# Patient Record
Sex: MALE | Race: OTHER | HISPANIC OR LATINO | URBAN - METROPOLITAN AREA
[De-identification: names, ages, dates, MRNs, and addresses within clinical notes are randomized per-mention and may not be internally consistent; named-entity substitution may affect disease eponyms.]

---

## 2021-03-09 ENCOUNTER — OUTPATIENT (OUTPATIENT)
Dept: OUTPATIENT SERVICES | Facility: HOSPITAL | Age: 36
LOS: 1 days | End: 2021-03-09
Payer: SELF-PAY

## 2021-03-09 DIAGNOSIS — Z20.828 CONTACT WITH AND (SUSPECTED) EXPOSURE TO OTHER VIRAL COMMUNICABLE DISEASES: ICD-10-CM

## 2021-03-09 LAB — SARS-COV-2 RNA SPEC QL NAA+PROBE: SIGNIFICANT CHANGE UP

## 2021-03-09 PROCEDURE — U0003: CPT

## 2021-03-09 PROCEDURE — U0005: CPT

## 2022-05-26 ENCOUNTER — APPOINTMENT (OUTPATIENT)
Dept: PEDIATRICS | Facility: CLINIC | Age: 37
End: 2022-05-26

## 2022-05-27 PROBLEM — Z00.00 ENCOUNTER FOR PREVENTIVE HEALTH EXAMINATION: Status: ACTIVE | Noted: 2022-05-27

## 2022-07-07 ENCOUNTER — EMERGENCY (EMERGENCY)
Facility: HOSPITAL | Age: 37
LOS: 1 days | Discharge: HOME | End: 2022-07-07
Attending: EMERGENCY MEDICINE
Payer: COMMERCIAL

## 2022-07-07 VITALS
OXYGEN SATURATION: 98 % | TEMPERATURE: 98 F | DIASTOLIC BLOOD PRESSURE: 56 MMHG | HEIGHT: 65.75 IN | HEART RATE: 65 BPM | SYSTOLIC BLOOD PRESSURE: 120 MMHG | RESPIRATION RATE: 20 BRPM | WEIGHT: 210.1 LBS

## 2022-07-07 PROCEDURE — 99282 EMERGENCY DEPT VISIT SF MDM: CPT

## 2022-07-07 NOTE — ED PROVIDER NOTE - MUSCULOSKELETAL, MLM
Spine appears normal, range of motion is not limited, no muscle or joint tenderness, minimal joint laxity,

## 2022-07-07 NOTE — ED PROVIDER NOTE - CLINICAL SUMMARY MEDICAL DECISION MAKING FREE TEXT BOX
PT with stable VS, no acute distress, non toxic appearing, tolerating PO in the ED, Pt neuro vasc intact, stable knee, Upson knee rules neg for xray, Pt to be dc home with follow up to PCP, ortho, educated about when to return to the ED if needed. PT verbalizes that he understands all instructions and results. Pt informed that ED is open and available 24/7 365 days a yr, encouraged to return to the ED if they have any change in condition, or feel the need for revaluation.

## 2022-07-07 NOTE — ED ADULT NURSE NOTE - OBJECTIVE STATEMENT
pt c/o pain to right knee after twisting it when he fell from a ladder.  Ambulatory.  No obvious trauma present to knee.

## 2022-07-07 NOTE — ED PROVIDER NOTE - NS ED ATTENDING STATEMENT MOD
This was a shared visit with the BRAVO. I reviewed and verified the documentation and independently performed the documented:

## 2022-07-07 NOTE — ED PROVIDER NOTE - PATIENT PORTAL LINK FT
You can access the FollowMyHealth Patient Portal offered by [facility] by registering at the following website: [email or web address]. By joining [facility]’s FollowMyHealth portal, you will also be able to view your health information using other applications (apps) compatible with our system.

## 2022-07-07 NOTE — ED PROVIDER NOTE - PROGRESS NOTE DETAILS
Seth Raygoza PA-C: Pt informed that Xray at this time would be limited study due to needing evaluation of soft tissue will need MR, PT educated that he will need to follow up to schedule MR.

## 2022-07-07 NOTE — ED PROVIDER NOTE - ADDITIONAL NOTES AND INSTRUCTIONS:
PT was evaluated At [facility] ED and was found to have a condition that warranted time of to rest and heal from WORK/SCHOOL.   Seth Raygoza PA-C

## 2022-07-07 NOTE — ED PROVIDER NOTE - NSFOLLOWUPINSTRUCTIONS_ED_ALL_ED_FT
Dolor wilfred de rodilla en los adultos    Acute Knee Pain, Adult      El dolor wilfred de rodilla es repentino y puede deberse a daño, hinchazón o irritación de los músculos y tejidos que sostienen la rodilla. El dolor puede ser consecuencia de lo siguiente:  •Michael caída.      •Michael lesión en la rodilla debido a movimientos de rotación.      •Un golpe en la rodilla.      •Michael infección.      El dolor wilfred de rodilla puede desaparecer solo con el tiempo y el descanso. De no ser así, winters médico podría indicarle que se anders estudios para hallar la causa del dolor. Pueden incluir:  •Estudios de diagnóstico por imágenes, ana michael radiografía, michael resonancia magnética (RM), michael exploración por tomografía computarizada (TC) o michael ecografía.      •Aspiración de michael articulación. En radhika estudio, se extrae líquido de la rodilla y se evalúa.      •Artroscopia. En radhika estudio, se introduce un tubo iluminado en la rodilla y se proyecta michael imagen en michael pantalla de televisión.      •Biopsia. En radhika estudio, se gus michael muestra de tejido del organismo y se la estudia con un microscopio.        Siga estas instrucciones en winters casa:      Si tiene michael rodillera o un dispositivo ortopédico:      •Use la rodillera o el dispositivo ortopédico ana se lo haya indicado el médico. Quíteselos solamente ana se lo haya indicado el médico.      •Aflójelos si siente hormigueo en los dedos del pie, se le adormecen o se le enfrían y se tornan azulados.      •Manténgalos limpios.    •Si la rodillera o el dispositivo ortopédico no son impermeables:  •No deje que se mojen.      •Cúbralos con un envoltorio hermético cuando tome un baño de inmersión o michael ducha.        Actividad     •Descanse la rodilla.      • No anders cosas que le causen dolor o que lo intensifiquen.      •Evite las actividades o los ejercicios de alto impacto, ana correr, saltar la soga o hacer tamia de tijera.      •Trabaje con un fisioterapeuta para crear un programa de ejercicios seguros, según lo recomiende el médico. Anders ejercicios ana se lo haya indicado el fisioterapeuta.        Control del dolor, la rigidez y la hinchazón    •Si se lo indican, aplique hielo sobre la rodilla afectada. Para hacer esto:  •Si usa michael rodillera o un dispositivo ortopédico desmontables, quíteselos según las indicaciones del médico.      •Ponga el hielo en michael bolsa plástica.      •Coloque michael toalla entre la piel y la bolsa.      •Aplique el hielo benny 20 minutos, 2 o 3 veces por día.      •Retire el hielo si la piel se pone de color gomez brillante. Graf es muy importante. Si no puede sentir dolor, calor o frío, tiene un mayor riesgo de que se dañe la renee.        •Si se lo indican, use michael venda elástica para ejercer presión (compresión) en la rodilla lesionada. Graf puede controlar la hinchazón, darle sostén y ayudar a aliviar las molestias.      •Cuando esté sentado o acostado, levante (eleve) la rodilla por encima del nivel del corazón.      •Duerma con michael almohada debajo de la rodilla.      Indicaciones generales     •Use los medicamentos de venta gayle y los recetados solamente ana se lo haya indicado el médico.      • No consuma ningún producto que contenga nicotina o tabaco, ana cigarrillos, cigarrillos electrónicos y tabaco de mascar. Si necesita ayuda para dejar de consumir estos productos, consulte al médico.      •Si tiene sobrepeso, trabaje con el médico y un nutricionista para establecer michael meta de pérdida de peso que sea saludable y razonable para usted. El exceso de peso puede generar presión en la rodilla.      •Esté atento a cualquier cambio en los síntomas.      •Cumpla con todas las visitas de seguimiento. Graf es importante.        Comuníquese con un médico si:    •El dolor de rodilla continúa, cambia o empeora.      •Tiene fiebre junto con dolor de rodilla.      •La rodilla se siente caliente al tacto o está enrojecida.      •La rodilla se le tuerce o se le traba.        Solicite ayuda de inmediato si:    •La rodilla se hincha, y la hinchazón empeora.      •No puede  la rodilla.      •Tiene un dolor intenso en la rodilla que no se puede controlar con analgésicos.        Resumen    •El dolor wilfred de rodilla puede deberse a michael caída, michael lesión, michael infección o a daño, hinchazón o irritación de los tejidos que sostienen la rodilla.      •El médico podría hacerle estudios para hallar la causa del dolor.      •Esté atento a cualquier cambio en los síntomas. Alivie el dolor con descanso, medicamentos, actividad de poca intensidad y el uso de hielo.      •Obtenga ayuda de inmediato si la rodilla se hincha, no puede  la rodilla o tiene un dolor intenso que no se puede controlar con medicamentos.      Esta información no tiene ana fin reemplazar el consejo del médico. Asegúrese de hacerle al médico cualquier pregunta que tenga.

## 2022-07-07 NOTE — ED PROVIDER NOTE - CARE PROVIDER_API CALL
Sudeep Parmar)  Orthopedics  [address] [place] [id number]  Phone: [phone]  Fax: [phone]  Follow Up Time:

## 2022-07-07 NOTE — ED PROVIDER NOTE - NS ED ROS FT
ROS: CONTUSIONAL: Denies fever, chills, fatigue, wt loss. HEAD: Denies trauma, HA, Dizziness. EYE: Denies Acute visual changes, diplopia. ENMT: Denies change in hearing, tinnitus, epistaxis, difficulty swallowing, sore throat. CARDIO: Denies CP, palpitations, edema. RESP: Denies Cough, SOB , Diff breathing, hemoptysis. GI: Denies N/V, ABD pain, change in bowel movement. URINARY: Denies difficulty urinating, pelvic pain. MS: + joint pain,  Denies back pain, weakness, decreased ROM, swelling. NEURO: Denies change in gait, seizures, loss of sensation, dizziness, confusion LOC.  PSY: NO SI/HI. SKIN: Denies Rash, bruising.

## 2022-07-07 NOTE — ED PROVIDER NOTE - OBJECTIVE STATEMENT
PT with no SPMHx presents to the ED with complaint of Rt knee pain. Pt states that he had a acute on chronic knee pain. Pt states that he had a knee injury 3 wk ago then today he was at work stumbled and extended his knee and had a sudden onset of pain in his knee. Pt states that he had constat dull moderte pain that is non radiating made worse with activity improved by nothing. PT denies fever, chills, numbness tingling, loss of sensation saddle anesthesia, weakness, HE, loss of control of urine, or bowels IVDA.

## 2022-07-08 NOTE — CHART NOTE - NSCHARTNOTEFT_GEN_A_CORE
Outpatient Follow Up scheduled with Dr. Gallo on 7/15/22 at 10:00am  Orthopaedic Surgery  [address] [place] [id number]  Phone: [phone]

## 2022-07-15 ENCOUNTER — APPOINTMENT (OUTPATIENT)
Dept: ORTHOPEDIC SURGERY | Facility: CLINIC | Age: 37
End: 2022-07-15

## 2022-07-15 VITALS
HEIGHT: 67 IN | WEIGHT: 210 LBS | HEART RATE: 58 BPM | DIASTOLIC BLOOD PRESSURE: 84 MMHG | BODY MASS INDEX: 32.96 KG/M2 | SYSTOLIC BLOOD PRESSURE: 116 MMHG

## 2022-07-15 DIAGNOSIS — Z78.9 OTHER SPECIFIED HEALTH STATUS: ICD-10-CM

## 2022-07-15 DIAGNOSIS — Z72.3 LACK OF PHYSICAL EXERCISE: ICD-10-CM

## 2022-07-15 PROCEDURE — 73562 X-RAY EXAM OF KNEE 3: CPT | Mod: RT

## 2022-07-15 PROCEDURE — 99203 OFFICE O/P NEW LOW 30 MIN: CPT

## 2022-07-15 NOTE — HISTORY OF PRESENT ILLNESS
[Pain Location] : pain [] : right knee [Worsening] : worsening [6] : a current pain level of 6/10 [9] : an average pain level of 9/10 [8] : a minimum pain level of 8/10 [10] : a maximum pain level of 10/10 [Intermit.] : ~He/She~ states the symptoms seem to be intermittent [Direct Pressure] : worsened by direct pressure [Running] : worsened by running [Walking] : worsened by walking [Knee Flexion] : worsened with knee flexion [Knee Extension] : worsened with knee extension [Rest] : relieved by rest [de-identified] : 7/15/2022: Scooby is a pleasant 36-year-old male who presents to the office today complaining of a right knee injury that he sustained at work on 7/7/2022.  Patient states that he tripped on something and twisted his knee.  He felt immediate pain and subsequently developed swelling.  He had difficulty walking and was seen evaluated at another emergency department elsewhere where x-rays were taken and he was told he did not break anything.  He was told to follow-up with a specialist and he comes in to see me today for further recommendations.  He has been icing the knee and taking Tylenol for pain.  He has never injured this knee before.  He reports instability when he walks and the sensation of the knee giving out.  He has been wearing an over-the-counter brace.  The patient denies any fevers, chills, sweats, recent illnesses, numbness, tingling, weakness, or pain elsewhere at this time.

## 2022-07-15 NOTE — PHYSICAL EXAM
[de-identified] : General:\par Awake, alert, no acute distress, Patient was cooperative and appropriate during the examination.\par \par The patient is of normal weight for height and age.\par \par Walks with an antalgic gait on the right side.\par \par Full, painless range of motion of the neck and back.\par \par Exam of the bilateral lower extremities is intact and symmetric with regards to dermatologic, vascular, and neurologic exam. Bilateral lower extremity sensation is grossly intact to light touch in the DP/SP/T/S/S nerve distributions. Intact DF/PF/EHL. BIlateral lower extremity warm and well-perfused with brisk capillary refill.\par \par Pulmonary:\par Regular, nonlabored breathing\par \par Abdomen:\par Soft, nontender, nondistended.\par \par Lymphatic:\par No evidence of inguinal lymphadenopathy\par \par Right knee Examination:\par Physical examination of the knee demonstrates normal skin without signs of skin changes or abnormalities. No erythema or warmth is appreciated. There is a moderate joint effusion.\par \par Sensation is intact to light touch L2-S1\par Palpable DP/PT pulse\par EHL/FHL/TA/GSC motor function intact\par \par Range of Motion\par 0 to 100 degrees with pain at terminal flexion\par \par Strength Testing\par Quadriceps/Hamstring 5/5\par Patient is able to perform a straight leg raise without difficulty.\par \par Palpation\par Not tender to palpation about the distal femur, proximal tibia, or patella\par No palpable defect appreciated in the quadriceps or patellar tendons\par Exquisitely tender to palpation of medial joint line\par Mildly tender to palpation of lateral joint line\par \par Special Tests\par Anterior Drawer equivocal secondary to guarding\par Posterior Drawer negative\par Lachman Exam mildly increased laxity compared to contralateral side\par No Varus or Valgus Laxity at 0 or 30 degrees of knee flexion\par Rafa's Test positive medially\par Active compression of the patella is negative for pain\par Translation of the patella 2 quadrants with a firm endpoint [de-identified] : X-rays including 3 views of the right knee were obtained in the office today on 7/15/2022 and reviewed the patient.  There is no acute fracture or dislocation.  There is no significant arthritis.

## 2022-07-15 NOTE — DISCUSSION/SUMMARY
[de-identified] : Assessment: 36-year-old male with right knee pain and instability\par \par Plan: I had a long discussion with the patient today regarding the nature of their diagnosis and treatment plan. We discussed the risks and benefits of no treatment as well as nonoperative and operative treatments.  I reviewed the patient's x-rays today with him in the office which are negative for any acute pathology.  On examination he has significant pain medially about the knee with a mildly increased Lachman exam.  His instability exam is somewhat limited the patient's pain, swelling, and guarding.  He does report symptomatic instability when walking.  At this time I recommend an MRI to rule out any meniscal or ligamentous pathology.  He may continue to treat himself symptomatically on his own at home.  Recommend follow-up after the MRI to discuss the results in person and any further recommendations.  If he does have any significant internal derangement surgical intervention may be indicated.\par \par The patient verbalizes their understanding and agrees with the plan.  All questions were answered to their satisfaction.

## 2022-07-15 NOTE — REVIEW OF SYSTEMS
[Joint Pain] : joint pain [Joint Stiffness] : joint stiffness [Joint Swelling] : joint swelling [Negative] : Heme/Lymph [FreeTextEntry9] : right knee pain and instability

## 2022-07-15 NOTE — REASON FOR VISIT
[Initial Visit] : an initial visit for [[employer] ] : provided by [employer]   [FreeTextEntry2] : right knee injury DOI: 07/07/2022 [Interpreters_IDNumber] : [id number] [Interpreters_FullName] : Vivian [TWNoteComboBox1] : Taiwanese

## 2022-07-16 ENCOUNTER — APPOINTMENT (OUTPATIENT)
Dept: MRI IMAGING | Facility: CLINIC | Age: 37
End: 2022-07-16

## 2022-07-16 ENCOUNTER — OUTPATIENT (OUTPATIENT)
Dept: OUTPATIENT SERVICES | Facility: HOSPITAL | Age: 37
LOS: 1 days | End: 2022-07-16

## 2022-07-16 DIAGNOSIS — Z00.00 ENCOUNTER FOR GENERAL ADULT MEDICAL EXAMINATION WITHOUT ABNORMAL FINDINGS: ICD-10-CM

## 2022-07-16 PROCEDURE — 73721 MRI JNT OF LWR EXTRE W/O DYE: CPT | Mod: 26,RT

## 2022-08-01 ENCOUNTER — APPOINTMENT (OUTPATIENT)
Dept: ORTHOPEDIC SURGERY | Facility: CLINIC | Age: 37
End: 2022-08-01

## 2022-08-01 VITALS
HEART RATE: 60 BPM | HEIGHT: 67 IN | SYSTOLIC BLOOD PRESSURE: 120 MMHG | DIASTOLIC BLOOD PRESSURE: 80 MMHG | WEIGHT: 210 LBS | BODY MASS INDEX: 32.96 KG/M2

## 2022-08-01 PROCEDURE — 99214 OFFICE O/P EST MOD 30 MIN: CPT

## 2022-08-01 NOTE — HISTORY OF PRESENT ILLNESS
[Pain Location] : pain [] : right knee [Worsening] : worsening [6] : a current pain level of 6/10 [9] : an average pain level of 9/10 [8] : a minimum pain level of 8/10 [10] : a maximum pain level of 10/10 [Intermit.] : ~He/She~ states the symptoms seem to be intermittent [Direct Pressure] : worsened by direct pressure [Running] : worsened by running [Walking] : worsened by walking [Knee Flexion] : worsened with knee flexion [Knee Extension] : worsened with knee extension [Rest] : relieved by rest [de-identified] : 08/01/2022 : SCOOBY DAMON  is a 36 year year old male who presents to the office for evaluation of his right knee today from a work-related injury that occurred on 7/7/2022.  This visit was performed with the use of a .  He states that his symptoms have improved since last office visit and he has minimal discomfort in the knee however the knee feels unstable and weak at times.  He is here to discuss the MRI results today.  The patient denies any fevers, chills, sweats, recent illnesses, numbness, tingling, weakness, or pain elsewhere at this time.\par \par 7/15/2022: Scooby is a pleasant 36-year-old male who presents to the office today complaining of a right knee injury that he sustained at work on 7/7/2022.  Patient states that he tripped on something and twisted his knee.  He felt immediate pain and subsequently developed swelling.  He had difficulty walking and was seen evaluated at another emergency department elsewhere where x-rays were taken and he was told he did not break anything.  He was told to follow-up with a specialist and he comes in to see me today for further recommendations.  He has been icing the knee and taking Tylenol for pain.  He has never injured this knee before.  He reports instability when he walks and the sensation of the knee giving out.  He has been wearing an over-the-counter brace.  The patient denies any fevers, chills, sweats, recent illnesses, numbness, tingling, weakness, or pain elsewhere at this time.

## 2022-08-01 NOTE — DISCUSSION/SUMMARY
[de-identified] : Assessment: 36-year-old male with right knee ACL rupture and medial meniscus tear\par \par Plan: I had a long discussion with the patient today regarding the nature of their diagnosis and treatment plan. We discussed the risks and benefits of no treatment as well as nonoperative and operative treatments.  I reviewed the patient's MRI results today which showed a full-thickness ACL rupture, complex tear of the medial meniscus and medial meniscal root, and low-grade MCL sprain. I discussed the risk and the benefits of operative and nonoperative treatment with the patient today.  Nonsurgical treatments would include resting, icing, heating, stretching, anti-inflammatory medicines as needed, activity/lifestyle modifications, home exercises, bracing, physical therapy, and a gradual return to activities as tolerated.  The benefits of nonsurgical treatments at that they avoid the risks and recovery of surgery.  The disadvantages of that his ACL meniscus injuries will not heal and he he may continue to have pain and symptomatic instability in the knee.  Furthermore, recurrent instability can lead to further damage to the articular surfaces and cartilage in the knee as well as to the menisci.  Given his age and activity level surgical intervention is recommended.  Surgical treatment would include a right knee arthroscopically assisted ACL reconstruction with BTB allograft versus autograft and possible medial meniscus repair versus partial meniscectomy.  We discussed the risks and benefits of the surgery in detail.  At this point the patient is self-pay and is uninsured.  He has an employer who he tells me he is willing to pay for his medical expenses.  I explained that he should confirm this with his employer prior to committing to any surgical procedure.  He could also consider exploring the option of Worker's Compensation as this is technically a work-related injury.  In the interim I recommend a course of physical therapy to work on his strength and range of motion.  He was prescribed a short runner brace today for stability.  Recommend follow-up in 6 to 8 weeks for repeat evaluation.\par \par The patient verbalizes their understanding and agrees with the plan.  All questions were answered to their satisfaction.

## 2022-08-01 NOTE — PHYSICAL EXAM
[de-identified] : General:\par Awake, alert, no acute distress, Patient was cooperative and appropriate during the examination.\par \par The patient is of normal weight for height and age.\par \par Walks with an antalgic gait on the right side.\par \par Full, painless range of motion of the neck and back.\par \par Exam of the bilateral lower extremities is intact and symmetric with regards to dermatologic, vascular, and neurologic exam. Bilateral lower extremity sensation is grossly intact to light touch in the DP/SP/T/S/S nerve distributions. Intact DF/PF/EHL. BIlateral lower extremity warm and well-perfused with brisk capillary refill.\par \par Pulmonary:\par Regular, nonlabored breathing\par \par Abdomen:\par Soft, nontender, nondistended.\par \par Lymphatic:\par No evidence of inguinal lymphadenopathy\par \par Right knee Examination:\par Physical examination of the knee demonstrates normal skin without signs of skin changes or abnormalities. No erythema or warmth is appreciated. There is a mild to moderate joint effusion.\par \par Sensation is intact to light touch L2-S1\par Palpable DP/PT pulse\par EHL/FHL/TA/GSC motor function intact\par \par Range of Motion\par 0 to 120 degrees with pain at terminal flexion\par \par Strength Testing\par Quadriceps/Hamstring 5/5\par Patient is able to perform a straight leg raise without difficulty.\par \par Palpation\par Not tender to palpation about the distal femur, proximal tibia, or patella\par No palpable defect appreciated in the quadriceps or patellar tendons\par Moderately tender to palpation of medial joint line\par Mildly tender to palpation of lateral joint line\par \par Special Tests\par Anterior Drawer equivocal secondary to guarding\par Posterior Drawer negative\par Lachman Exam mildly increased laxity compared to contralateral side\par No Varus or Valgus Laxity at 0 or 30 degrees of knee flexion\par Rafa's Test positive medially\par Active compression of the patella is negative for pain\par Translation of the patella 2 quadrants with a firm endpoint [de-identified] : MRI taken [facility] on 7/16/2022 showed a complete ACL tear and a lateral posterior tibial plateau edema consistent with a contusion and low-grade sprain of the MCL with complex partial tear of the posterior horn/root of the medial meniscus with a  moderate joint effusion.\par \par X-rays including 3 views of the right knee were obtained in the office today on 7/15/2022 and reviewed the patient.  There is no acute fracture or dislocation.  There is no significant arthritis.

## 2022-08-01 NOTE — REASON FOR VISIT
[[employer] ] : provided by [employer]   [Initial Visit] : an initial visit for [FreeTextEntry2] : right knee injury DOI: 07/07/2022 [Interpreters_IDNumber] : [id number] [Interpreters_FullName] : Vivian [TWNoteComboBox1] : Malagasy

## 2022-09-15 ENCOUNTER — NON-APPOINTMENT (OUTPATIENT)
Age: 37
End: 2022-09-15

## 2022-09-15 ENCOUNTER — APPOINTMENT (OUTPATIENT)
Dept: ORTHOPEDIC SURGERY | Facility: CLINIC | Age: 37
End: 2022-09-15

## 2022-09-15 VITALS
HEART RATE: 69 BPM | WEIGHT: 210 LBS | SYSTOLIC BLOOD PRESSURE: 111 MMHG | HEIGHT: 67 IN | DIASTOLIC BLOOD PRESSURE: 72 MMHG | BODY MASS INDEX: 32.96 KG/M2

## 2022-09-15 PROCEDURE — 99214 OFFICE O/P EST MOD 30 MIN: CPT

## 2022-09-15 PROCEDURE — 99072 ADDL SUPL MATRL&STAF TM PHE: CPT

## 2022-09-15 NOTE — HISTORY OF PRESENT ILLNESS
[Pain Location] : pain [] : right knee [Worsening] : worsening [6] : a current pain level of 6/10 [9] : an average pain level of 9/10 [8] : a minimum pain level of 8/10 [10] : a maximum pain level of 10/10 [Intermit.] : ~He/She~ states the symptoms seem to be intermittent [Direct Pressure] : worsened by direct pressure [Running] : worsened by running [Walking] : worsened by walking [Knee Flexion] : worsened with knee flexion [Knee Extension] : worsened with knee extension [Rest] : relieved by rest [de-identified] : 9/15/2022: Scooby is a 36-year-old male who returns to the office today for reassessment of his right knee pain and instability secondary to an ACL meniscus tear sustained at work on 7/7/2022.  This visit was performed with the use of a  from [employer].  The patient states his pain and swelling have improved since the time of the injury but the knee continues to feel unstable.  He has been walking with a short runner brace which has been helpful for his stability.  He recently had a case open to Worker's Compensation regarding this injury and he returns today to discuss possible surgery.  He denies any new injury.  The patient denies any fevers, chills, sweats, recent illnesses, numbness, tingling, weakness, or pain elsewhere at this time.\par \par 08/01/2022 : SCOOBY DAMON  is a 36 year year old male who presents to the office for evaluation of his right knee today from a work-related injury that occurred on 7/7/2022.  This visit was performed with the use of a .  He states that his symptoms have improved since last office visit and he has minimal discomfort in the knee however the knee feels unstable and weak at times.  He is here to discuss the MRI results today.  The patient denies any fevers, chills, sweats, recent illnesses, numbness, tingling, weakness, or pain elsewhere at this time.\par \par 7/15/2022: Scooby is a pleasant 36-year-old male who presents to the office today complaining of a right knee injury that he sustained at work on 7/7/2022.  Patient states that he tripped on something and twisted his knee.  He felt immediate pain and subsequently developed swelling.  He had difficulty walking and was seen evaluated at another emergency department elsewhere where x-rays were taken and he was told he did not break anything.  He was told to follow-up with a specialist and he comes in to see me today for further recommendations.  He has been icing the knee and taking Tylenol for pain.  He has never injured this knee before.  He reports instability when he walks and the sensation of the knee giving out.  He has been wearing an over-the-counter brace.  The patient denies any fevers, chills, sweats, recent illnesses, numbness, tingling, weakness, or pain elsewhere at this time.

## 2022-09-15 NOTE — REASON FOR VISIT
[Initial Visit] : an initial visit for [Workers' Comp: Date of Injury: _______] : This visit is related to worker's compensation. Date of Injury: [unfilled] [[employer] ] : provided by [employer]   [FreeTextEntry2] : right knee injury DOI: 07/07/2022 [Interpre[employer]_IDNumber] : [id number] [Interpreters_FullName] : Vivian [TWNoteComboBox1] : [place]

## 2022-09-15 NOTE — REVIEW OF SYSTEMS
[Joint Pain] : joint pain [Negative] : Heme/Lymph [Joint Stiffness] : joint stiffness [Joint Swelling] : joint swelling [FreeTextEntry9] : right knee pain and instability

## 2022-09-15 NOTE — DISCUSSION/SUMMARY
[de-identified] : Assessment: 36-year-old male with right knee ACL rupture and medial meniscus tear\par \par Plan: I had a long discussion with the patient today regarding the nature of their diagnosis and treatment plan. We discussed the risks and benefits of no treatment as well as nonoperative and operative treatments.  I reviewed the patient's MRI results today which showed a full-thickness ACL rupture, complex tear of the medial meniscus and medial meniscal root, and low-grade MCL sprain. I discussed the risk and the benefits of operative and nonoperative treatment with the patient today.  Nonsurgical treatments would include resting, icing, heating, stretching, anti-inflammatory medicines as needed, activity/lifestyle modifications, home exercises, bracing, physical therapy, and a gradual return to activities as tolerated.  The benefits of nonsurgical treatments at that they avoid the risks and recovery of surgery.  The disadvantages of that his ACL meniscus injuries will not heal and he he may continue to have pain and symptomatic instability in the knee.  Furthermore, recurrent instability can lead to further damage to the articular surfaces and cartilage in the knee as well as to the menisci.  Given his age and activity level surgical intervention is recommended.  Surgical treatment would include a right knee arthroscopically assisted ACL reconstruction with BTB allograft and possible medial meniscus repair versus partial meniscectomy.  We discussed the risks and benefits of the surgery in detail.  The benefits of surgery include improved knee pain and instability while mitigating the risk of further damage to the articular surfaces of the joint as well as the menisci.  The risks of surgery include but not limited to infection, blood loss, blood clots, neurovascular injury, stiffness/loss range of motion, persistent pain, progressive arthritis, fracture, recurrent instability, failure of hardware, retearing of the graft, painful hardware, anesthesia related complications including paralysis and death, the need for further surgery in the future.  We discussed the risks and benefits of different types of graft use and ACL surgery.  We discussed the risks associate with use of allograft tissue including disease transmission.  The patient is comfortable proceeding with allograft reconstruction for the ACL.  Postoperative the patient will be protected weightbearing in a brace and on crutches for 2 to 6 weeks depending on the procedures performed for the meniscus.  Physical therapy will start 1 week after surgery and will be for 2 to 3 days a week for minimum of 6 months postoperatively.  The patient will not be able to drive while in a brace and on crutches.  He will not be allowed to do any heavy lifting or heavy labor for work for several months postoperatively.  Clearance to return to sport or heavy labor is determined on an individual basis.  The patient verbalizes understanding of all surgical risks as well as anticipated postoperative course and would like to proceed with surgery in the near future.  He will speak with my surgical coordinator regarding presurgical testing, clearance, and scheduling the surgery.\par \par The patient verbalizes their understanding and agrees with the plan.  All questions were answered to their satisfaction.

## 2022-09-15 NOTE — PHYSICAL EXAM
[de-identified] : General:\par Awake, alert, no acute distress, Patient was cooperative and appropriate during the examination.\par \par The patient is of normal weight for height and age.\par \par Walks with an antalgic gait on the right side.\par \par Full, painless range of motion of the neck and back.\par \par Exam of the bilateral lower extremities is intact and symmetric with regards to dermatologic, vascular, and neurologic exam. Bilateral lower extremity sensation is grossly intact to light touch in the DP/SP/T/S/S nerve distributions. Intact DF/PF/EHL. BIlateral lower extremity warm and well-perfused with brisk capillary refill.\par \par Pulmonary:\par Regular, nonlabored breathing\par \par Abdomen:\par Soft, nontender, nondistended.\par \par Lymphatic:\par No evidence of inguinal lymphadenopathy\par \par Right knee Examination:\par Physical examination of the knee demonstrates normal skin without signs of skin changes or abnormalities. No erythema or warmth is appreciated.  No effusion.\par \par Sensation is intact to light touch L2-S1\par Palpable DP/PT pulse\par EHL/FHL/TA/GSC motor function intact\par \par Range of Motion\par 0 to 125 degrees with pain at terminal flexion\par \par Strength Testing\par Quadriceps/Hamstring 5/5\par Patient is able to perform a straight leg raise without difficulty.\par \par Palpation\par Not tender to palpation about the distal femur, proximal tibia, or patella\par No palpable defect appreciated in the quadriceps or patellar tendons\par Moderately tender to palpation of medial joint line\par Mildly tender to palpation of lateral joint line\par \par Special Tests\par Anterior Drawer positive\par Posterior Drawer negative\par Lachman Exam positive\par No Varus or Valgus Laxity at 0 or 30 degrees of knee flexion\par Rafa's Test positive medially\par Active compression of the patella is negative for pain\par Translation of the patella 2 quadrants with a firm endpoint [de-identified] : MRI taken at [facility] on 7/16/2022 showed a complete ACL tear and a lateral posterior tibial plateau edema consistent with a contusion and low-grade sprain of the MCL with complex partial tear of the posterior horn/root of the medial meniscus with a  moderate joint effusion.\par \par X-rays including 3 views of the right knee were obtained in the office today on 7/15/2022 and reviewed the patient.  There is no acute fracture or dislocation.  There is no significant arthritis.

## 2022-10-04 ENCOUNTER — OUTPATIENT (OUTPATIENT)
Dept: OUTPATIENT SERVICES | Facility: HOSPITAL | Age: 37
LOS: 1 days | End: 2022-10-04
Payer: COMMERCIAL

## 2022-10-04 DIAGNOSIS — Z01.818 ENCOUNTER FOR OTHER PREPROCEDURAL EXAMINATION: ICD-10-CM

## 2022-10-04 LAB
A1C WITH ESTIMATED AVERAGE GLUCOSE RESULT: 5.1 % — SIGNIFICANT CHANGE UP (ref 4–5.6)
ANION GAP SERPL CALC-SCNC: 11 MMOL/L — SIGNIFICANT CHANGE UP (ref 5–17)
APPEARANCE UR: CLEAR — SIGNIFICANT CHANGE UP
APTT BLD: 29.1 SEC — SIGNIFICANT CHANGE UP (ref 27.5–35.5)
BASOPHILS # BLD AUTO: 0.02 K/UL — SIGNIFICANT CHANGE UP (ref 0–0.2)
BASOPHILS NFR BLD AUTO: 0.3 % — SIGNIFICANT CHANGE UP (ref 0–2)
BILIRUB UR-MCNC: NEGATIVE — SIGNIFICANT CHANGE UP
BUN SERPL-MCNC: 14.9 MG/DL — SIGNIFICANT CHANGE UP (ref 8–20)
CALCIUM SERPL-MCNC: 9.2 MG/DL — SIGNIFICANT CHANGE UP (ref 8.4–10.5)
CHLORIDE SERPL-SCNC: 104 MMOL/L — SIGNIFICANT CHANGE UP (ref 98–107)
CO2 SERPL-SCNC: 27 MMOL/L — SIGNIFICANT CHANGE UP (ref 22–29)
COLOR SPEC: YELLOW — SIGNIFICANT CHANGE UP
CREAT SERPL-MCNC: 0.77 MG/DL — SIGNIFICANT CHANGE UP (ref 0.5–1.3)
DIFF PNL FLD: ABNORMAL
EGFR: 118 ML/MIN/1.73M2 — SIGNIFICANT CHANGE UP
EOSINOPHIL # BLD AUTO: 0.08 K/UL — SIGNIFICANT CHANGE UP (ref 0–0.5)
EOSINOPHIL NFR BLD AUTO: 1.2 % — SIGNIFICANT CHANGE UP (ref 0–6)
ESTIMATED AVERAGE GLUCOSE: 100 MG/DL — SIGNIFICANT CHANGE UP (ref 68–114)
GLUCOSE SERPL-MCNC: 76 MG/DL — SIGNIFICANT CHANGE UP (ref 70–99)
GLUCOSE UR QL: NEGATIVE MG/DL — SIGNIFICANT CHANGE UP
HCT VFR BLD CALC: 40.8 % — SIGNIFICANT CHANGE UP (ref 39–50)
HGB BLD-MCNC: 13.9 G/DL — SIGNIFICANT CHANGE UP (ref 13–17)
IMM GRANULOCYTES NFR BLD AUTO: 0.4 % — SIGNIFICANT CHANGE UP (ref 0–0.9)
INR BLD: 1.05 RATIO — SIGNIFICANT CHANGE UP (ref 0.88–1.16)
KETONES UR-MCNC: NEGATIVE — SIGNIFICANT CHANGE UP
LEUKOCYTE ESTERASE UR-ACNC: NEGATIVE — SIGNIFICANT CHANGE UP
LYMPHOCYTES # BLD AUTO: 2.53 K/UL — SIGNIFICANT CHANGE UP (ref 1–3.3)
LYMPHOCYTES # BLD AUTO: 37.6 % — SIGNIFICANT CHANGE UP (ref 13–44)
MCHC RBC-ENTMCNC: 31.2 PG — SIGNIFICANT CHANGE UP (ref 27–34)
MCHC RBC-ENTMCNC: 34.1 GM/DL — SIGNIFICANT CHANGE UP (ref 32–36)
MCV RBC AUTO: 91.7 FL — SIGNIFICANT CHANGE UP (ref 80–100)
MONOCYTES # BLD AUTO: 0.49 K/UL — SIGNIFICANT CHANGE UP (ref 0–0.9)
MONOCYTES NFR BLD AUTO: 7.3 % — SIGNIFICANT CHANGE UP (ref 2–14)
NEUTROPHILS # BLD AUTO: 3.57 K/UL — SIGNIFICANT CHANGE UP (ref 1.8–7.4)
NEUTROPHILS NFR BLD AUTO: 53.2 % — SIGNIFICANT CHANGE UP (ref 43–77)
NITRITE UR-MCNC: NEGATIVE — SIGNIFICANT CHANGE UP
PH UR: 6 — SIGNIFICANT CHANGE UP (ref 5–8)
PLATELET # BLD AUTO: 228 K/UL — SIGNIFICANT CHANGE UP (ref 150–400)
POTASSIUM SERPL-MCNC: 3.8 MMOL/L — SIGNIFICANT CHANGE UP (ref 3.5–5.3)
POTASSIUM SERPL-SCNC: 3.8 MMOL/L — SIGNIFICANT CHANGE UP (ref 3.5–5.3)
PROT UR-MCNC: NEGATIVE — SIGNIFICANT CHANGE UP
PROTHROM AB SERPL-ACNC: 12.2 SEC — SIGNIFICANT CHANGE UP (ref 10.5–13.4)
RBC # BLD: 4.45 M/UL — SIGNIFICANT CHANGE UP (ref 4.2–5.8)
RBC # FLD: 12.4 % — SIGNIFICANT CHANGE UP (ref 10.3–14.5)
RBC CASTS # UR COMP ASSIST: SIGNIFICANT CHANGE UP /HPF (ref 0–4)
SODIUM SERPL-SCNC: 142 MMOL/L — SIGNIFICANT CHANGE UP (ref 135–145)
SP GR SPEC: 1.02 — SIGNIFICANT CHANGE UP (ref 1.01–1.02)
UROBILINOGEN FLD QL: NEGATIVE MG/DL — SIGNIFICANT CHANGE UP
WBC # BLD: 6.72 K/UL — SIGNIFICANT CHANGE UP (ref 3.8–10.5)
WBC # FLD AUTO: 6.72 K/UL — SIGNIFICANT CHANGE UP (ref 3.8–10.5)
WBC UR QL: NEGATIVE /HPF — SIGNIFICANT CHANGE UP (ref 0–5)

## 2022-10-04 PROCEDURE — G0463: CPT

## 2022-10-06 LAB
CULTURE RESULTS: NO GROWTH — SIGNIFICANT CHANGE UP
SPECIMEN SOURCE: SIGNIFICANT CHANGE UP

## 2022-10-18 ENCOUNTER — APPOINTMENT (OUTPATIENT)
Dept: ORTHOPEDIC SURGERY | Facility: AMBULATORY SURGERY CENTER | Age: 37
End: 2022-10-18

## 2022-10-18 PROCEDURE — 29882 ARTHRS KNE SRG MNISC RPR M/L: CPT | Mod: RT

## 2022-10-18 PROCEDURE — 29888 ARTHRS AID ACL RPR/AGMNTJ: CPT | Mod: RT

## 2022-10-18 RX ORDER — OXYCODONE 5 MG/1
5 TABLET ORAL
Qty: 30 | Refills: 0 | Status: ACTIVE | COMMUNITY
Start: 2022-10-17 | End: 1900-01-01

## 2022-10-18 RX ORDER — ASPIRIN 325 MG/1
325 TABLET, FILM COATED ORAL DAILY
Qty: 14 | Refills: 0 | Status: ACTIVE | COMMUNITY
Start: 2022-10-17 | End: 1900-01-01

## 2022-11-01 ENCOUNTER — APPOINTMENT (OUTPATIENT)
Dept: ORTHOPEDIC SURGERY | Facility: CLINIC | Age: 37
End: 2022-11-01

## 2022-11-01 ENCOUNTER — FORM ENCOUNTER (OUTPATIENT)
Age: 37
End: 2022-11-01

## 2022-11-01 PROCEDURE — 99024 POSTOP FOLLOW-UP VISIT: CPT

## 2022-11-01 NOTE — BEGINNING OF VISIT
[Patient] : patient [] :  [[employer] ] : provided by [employer]   [[employer]pre[employer]_IDNumber] : [id number] [Interpreters_FullName] : Brenda [[id number]] : Maltese

## 2022-11-01 NOTE — HISTORY OF PRESENT ILLNESS
[de-identified] : 37-year-old male status post right knee ACL graft reconstruction with BTB allograft and medial meniscus repair on 10/18/2022 [de-identified] : Patient is a 37-year-old male status post right knee ACL graft reconstruction with BTB allograft and medial meniscus repair.  This visit was performed with the use of a .  He has been compliant with his postoperative restrictions in the brace and using crutches.  He has not started physical therapy but has been doing gentle exercises on his own at home.  He denies any fevers, chills, shortness of breath and states his pain is well controlled.  He is here for routine follow-up today.  He denies any numbness or tingling distally. [de-identified] : On exam, the patient is pleasant.  They  are awake, alert, and oriented x3.  The patient presents today with a Greer brace and crutches.\par Weight is appropriate for height\par Full range of motion of cervical spine without instability\par Full range of motion of back without instability\par Intact neurologic, vascular, and dermatologic exam to right and left upper extremities\par Intact neurologic, vascular, and dermatologic exam to right and left lower extremities\par \par Right lower Extremity\par The patient's incisions are well-healed. No erythema, warmth, or drainage.  Incisions are clean, dry and intact without any evidence of infection.  There is mild ecchymosis over the calf.  There is mild to moderate postoperative effusion.  No bony tenderness to palpation about the knee. Range of motion: 0 to 70 degrees.  Negative Rafa test, anterior drawer negative, Posterior Drawer negative, Lachman negative. No varus or valgus laxity at 0 or 30 degrees of knee flexion.  EHL/FHL/TA/GSC motor function is intact, sensations intact light touch in L2-S1 distributions, DP/PT pulses are palpable, compartments are soft and compressible, there is no calf tenderness to palpation bilaterally. [de-identified] : 37-year-old male status post right knee ACL graft reconstruction with BTB allograft and medial meniscus repair on 10/18/2022 [de-identified] : Scooby is recovering well from their recent surgery.  They have had improvements in their pain, swelling, and range of motion since the day of surgery.  At this time the patient may continue to remain partial weightbearing on the right lower extremity in the Hamilton brace locked in extension for ambulation.  He can unlock the brace for range of motion from 0 to 90 degrees as tolerated for range of motion exercises.  I am recommending he begin physical therapy to work on range of motion and he was given a physical therapy prescription today.  I emphasized the importance of physical therapy both formally and on his own to regain range of motion and function.  He will follow-up in 4 weeks for repeat evaluation we will likely progress his weightbearing and range of motion at that time.  Patient understands and agrees and all questions were answered.

## 2022-12-01 ENCOUNTER — APPOINTMENT (OUTPATIENT)
Dept: ORTHOPEDIC SURGERY | Facility: CLINIC | Age: 37
End: 2022-12-01

## 2022-12-01 PROCEDURE — 99024 POSTOP FOLLOW-UP VISIT: CPT

## 2022-12-01 NOTE — HISTORY OF PRESENT ILLNESS
[___ Weeks Post Op] : [unfilled] weeks post op [de-identified] : 37-year-old male status post right knee ACL graft reconstruction with BTB allograft and medial meniscus repair on 10/18/2022 [de-identified] : Patient is a 37-year-old male status post right knee ACL graft reconstruction with BTB allograft and medial meniscus repair.  This visit was performed with the use of a .  He has been compliant with his postoperative restrictions in the brace and using crutches.  He states he has been doing physical therapy and has noticed improvement in his range of motion, pain and inflammation.  He is very pleased so far and is here for routine follow-up today.  He denies any numbness or tingling distally.  He has no other complaints today. [de-identified] : On exam, the patient is pleasant.  They  are awake, alert, and oriented x3.  The patient presents today with a [place] brace and crutches.\par Weight is appropriate for height\par Full range of motion of cervical spine without instability\par Full range of motion of back without instability\par Intact neurologic, vascular, and dermatologic exam to right and left upper extremities\par Intact neurologic, vascular, and dermatologic exam to right and left lower extremities\par \par Right lower Extremity\par The patient's incisions are well-healed. No erythema, warmth, or drainage.  Incisions are well-healed benign appearance.  There is mild postoperative effusion.  No bony tenderness to palpation about the knee. Range of motion: 0 to 100 degrees.  Negative Rafa test, anterior drawer negative, Posterior Drawer negative, Lachman negative. No varus or valgus laxity at 0 or 30 degrees of knee flexion.  EHL/FHL/TA/GSC motor function is intact, sensations intact light touch in L2-S1 distributions, DP/PT pulses are palpable, compartments are soft and compressible, there is no calf tenderness to palpation bilaterally. [de-identified] : 37-year-old male status post right knee ACL graft reconstruction with BTB allograft and medial meniscus repair on 10/18/2022 [de-identified] : Scooby is recovering well from their recent surgery.  They have had improvements in their pain, swelling, and range of motion since the day of surgery.  At this time he can progress to weightbearing as tolerated with a knee brace unlocked.  He can also perform unrestricted range of motion at this time and can discontinue the crutches.  I advised him to use the brace for ambulation for the next several weeks and that he can gradually begin to wean from the brace as per the postoperative protocol.  He will continue with physical therapy for progressive range of motion and strengthening and will continue to follow.  Protocol provided.  He was given a new prescription today.  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  He will follow-up in 6 weeks for repeat evaluation in the office.  In the interim he will avoid any heavy lifting, pushing or pulling, sport related activities, running and jumping.  He understands and agrees.  He will continue with physical therapy to follow postoperative protocol provided and will follow-up in 6 weeks for repeat evaluation to reassess.  Patient understands and agrees and all questions were answered.

## 2022-12-01 NOTE — BEGINNING OF VISIT
[Patient] : patient [] :  [[employer] ] : provided by [employer]   [Interpreters_IDNumber] : [id number] [Interpreters_FullName] : Brenda [TWNoteComboBox1] : Citizen of [place]

## 2022-12-26 ENCOUNTER — FORM ENCOUNTER (OUTPATIENT)
Age: 37
End: 2022-12-26

## 2022-12-29 ENCOUNTER — FORM ENCOUNTER (OUTPATIENT)
Age: 37
End: 2022-12-29

## 2023-01-10 ENCOUNTER — APPOINTMENT (OUTPATIENT)
Dept: ORTHOPEDIC SURGERY | Facility: CLINIC | Age: 38
End: 2023-01-10
Payer: OTHER MISCELLANEOUS

## 2023-01-10 DIAGNOSIS — M25.561 PAIN IN RIGHT KNEE: ICD-10-CM

## 2023-01-10 PROCEDURE — 99024 POSTOP FOLLOW-UP VISIT: CPT

## 2023-01-10 NOTE — BEGINNING OF VISIT
[Patient] : patient [] :  [[employer] ] : provided by [employer]   [Interpre[employer]_IDNumber] : [id number] [Interpreters_FullName] : Brenda [TWNoteComboBox1] : [place]

## 2023-01-10 NOTE — HISTORY OF PRESENT ILLNESS
[de-identified] : 37-year-old male status post right knee ACL graft reconstruction with BTB allograft and medial meniscus repair on 10/18/2022 [de-identified] : Patient is a 37-year-old male status post right knee ACL graft reconstruction with BTB allograft and medial meniscus repair.  This visit was performed with the use of a .  The patient states he is doing well and has minimal to no pain at this point.  He has been going to physical therapy and has been working on his range of motion.  He has returned to work on a light-duty basis.  He is pleased with the progress and returns today for routine follow-up.  He denies any fevers, chills, sweats, redness, warmth, drainage, numbness, tingling, or pain elsewhere. [de-identified] : On exam, the patient is pleasant.  They  are awake, alert, and oriented x3.  The patient presents today with no assistive devices\par Weight is appropriate for height\par Full range of motion of cervical spine without instability\par Full range of motion of back without instability\par Intact neurologic, vascular, and dermatologic exam to right and left upper extremities\par Intact neurologic, vascular, and dermatologic exam to right and left lower extremities\par \par Right lower Extremity\par The patient's incisions are well-healed. No erythema, warmth, or drainage.  Incisions are well-healed benign appearance.  There is no postoperative effusion.  No bony tenderness to palpation about the knee. Range of motion: 0 to 130 degrees with mild discomfort at terminal degrees of flexion.  Negative Rafa test, anterior drawer negative, Posterior Drawer negative, Lachman negative. No varus or valgus laxity at 0 or 30 degrees of knee flexion.  EHL/FHL/TA/GSC motor function is intact, sensations intact light touch in L2-S1 distributions, DP/PT pulses are palpable, compartments are soft and compressible, there is no calf tenderness to palpation bilaterally. [de-identified] : 37-year-old male status post right knee ACL graft reconstruction with BTB allograft and medial meniscus repair on 10/18/2022 [de-identified] : Scooby is recovering well from their recent surgery.  Patient has minimal to no pain at this point with full range of motion and no swelling.  At this point he will continue with physical therapy and her postoperative protocol to optimize his strength and function.  He will avoid any contact sports, twisting, pivoting, or running in the knee at this point.  He may continue to work in a light-duty basis only.  He may return to full duty over the next 1-1/2 to 2 months.  Recommend follow-up in 3 months for repeat clinical assessment.  He verbalizes understanding and agrees with the plan.  All questions answered to his satisfaction.

## 2023-03-01 ENCOUNTER — FORM ENCOUNTER (OUTPATIENT)
Age: 38
End: 2023-03-01

## 2023-03-14 ENCOUNTER — FORM ENCOUNTER (OUTPATIENT)
Age: 38
End: 2023-03-14

## 2023-03-16 ENCOUNTER — APPOINTMENT (OUTPATIENT)
Dept: ORTHOPEDIC SURGERY | Facility: CLINIC | Age: 38
End: 2023-03-16
Payer: OTHER MISCELLANEOUS

## 2023-03-16 VITALS
HEIGHT: 66 IN | HEART RATE: 68 BPM | TEMPERATURE: 98.1 F | BODY MASS INDEX: 37.28 KG/M2 | DIASTOLIC BLOOD PRESSURE: 85 MMHG | SYSTOLIC BLOOD PRESSURE: 121 MMHG | WEIGHT: 232 LBS

## 2023-03-16 PROCEDURE — 99213 OFFICE O/P EST LOW 20 MIN: CPT

## 2023-03-16 PROCEDURE — 99072 ADDL SUPL MATRL&STAF TM PHE: CPT

## 2023-03-16 NOTE — REASON FOR VISIT
[Follow-Up Visit] : a follow-up visit for [Other: ____] : [unfilled] [[employer] ] : provided by [employer]   [Interpreters_IDNumber] : [id number] [Interpreters_FullName] : Xuan [TWNoteComboBox1] : [place]

## 2023-03-16 NOTE — HISTORY OF PRESENT ILLNESS
[de-identified] : 37-year-old male status post right knee ACL graft reconstruction with BTB allograft and medial meniscus repair on 10/18/2022 [de-identified] : Patient is a 37-year-old male status post right knee ACL graft reconstruction with BTB allograft and medial meniscus repair.  This visit was performed with the use of a .  The patient states he is doing well and has minimal to no pain at this point.  He states he has been doing physical therapy and has been doing well with physical therapy but has not had physical therapy in the last 10 days due to a pause and authorization.  He states he was notified yesterday that they were authorized for 8 more weeks of physical therapy and he plans on resuming this ASAP.  He states overall he is doing well and gets occasional discomfort over the knee when he is on his feet for long periods of time but is overall doing well and progressing.  He is here for routine follow-up today.  He has returned to full duty at work he denies any numbness or tingling distally.  The patient denies any fevers, chills, sweats, recent illnesses, numbness, tingling, weakness, or pain elsewhere at this time. [de-identified] : 37-year-old male status post right knee ACL graft reconstruction with BTB allograft and medial meniscus repair on 10/18/2022 [de-identified] : On exam, the patient is pleasant.  They  are awake, alert, and oriented x3.  The patient presents today with no assistive devices\par Weight is appropriate for height\par Full range of motion of cervical spine without instability\par Full range of motion of back without instability\par Intact neurologic, vascular, and dermatologic exam to right and left upper extremities\par Intact neurologic, vascular, and dermatologic exam to right and left lower extremities\par \par Right lower Extremity\par The patient's incisions are well-healed. No erythema, warmth, or drainage.  Incisions are well-healed benign appearance.  There is no postoperative effusion.  No bony tenderness to palpation about the knee. Range of motion: 0 to 130 degrees with no pain.  Negative Rafa test, anterior drawer negative, Posterior Drawer negative, Lachman negative. No varus or valgus laxity at 0 or 30 degrees of knee flexion.  EHL/FHL/TA/GSC motor function is intact, sensations intact light touch in L2-S1 distributions, DP/PT pulses are palpable, compartments are soft and compressible, there is no calf tenderness to palpation bilaterally. [de-identified] : Scooby is recovering well from their recent surgery.  Patient has minimal to no pain at this point with full range of motion and no swelling.  At this point he will continue with physical therapy and continue to follow the postoperative protocol to optimize his strength and function.  At this time he may begin a light running program on a treadmill over the next couple of weeks.  He will avoid running outside on uneven terrain.  He will avoid any sports, cutting, pivoting, jumping, twisting on the knee as this could lead to reinjury.  Recommend follow-up in 3 months for repeat clinical assessment.  If he is doing well at that time we may consider clearing him to return to sport pending functional evaluation.  The patient verbalizes understanding and agrees with the plan.  All questions were answered to his satisfaction.

## 2023-03-16 NOTE — BEGINNING OF VISIT
[Patient] : patient [] :  [[employer] ] : provided by [employer]   [Interpreters_IDNumber] : [id number] [Interpreters_FullName] : Brenda [TWNoteComboBox1] : [place]

## 2023-06-16 ENCOUNTER — APPOINTMENT (OUTPATIENT)
Dept: ORTHOPEDIC SURGERY | Facility: CLINIC | Age: 38
End: 2023-06-16
Payer: OTHER MISCELLANEOUS

## 2023-06-16 PROCEDURE — 99213 OFFICE O/P EST LOW 20 MIN: CPT

## 2023-06-16 PROCEDURE — 73562 X-RAY EXAM OF KNEE 3: CPT | Mod: LT

## 2023-06-16 RX ORDER — MELOXICAM 15 MG/1
15 TABLET ORAL
Qty: 21 | Refills: 0 | Status: ACTIVE | COMMUNITY
Start: 2023-06-16 | End: 1900-01-01

## 2023-06-16 NOTE — HISTORY OF PRESENT ILLNESS
[de-identified] : Patient is a 37-year-old male status post right knee ACL graft reconstruction with BTB allograft and medial meniscus repair.  This visit was performed with the use of a .  The patient states he is doing well and has minimal to no pain at this point.  He states he has been doing physical therapy and has been doing well and has no complaints for his right knee currently.  He states he does not feel ready to return to activities but generally feels well with his right knee.  He states however he is having issues with his left knee and pain to his left knee that is worse with different movements and activities and alleviated with rest.  He is here to discuss his left knee today.  He denies any numbness or tingling distally.  He denies any acute injury to his left knee.

## 2023-06-16 NOTE — REASON FOR VISIT
[Follow-Up Visit] : a follow-up visit for [[employer] ] : provided by [employer]   [Time Spent: ____ minutes] : Total time spent using  services: [unfilled] minutes. The patient's primary language is not English thus required  services. [Other: ____] : [unfilled] [Interpreters_IDNumber] : [id number] [Interpreters_FullName] : Xuan [[email or web address]] : Dutch

## 2023-06-16 NOTE — DISCUSSION/SUMMARY
[de-identified] : Assessment: 37-year-old male status post right knee ACL graft reconstruction with BTB allograft and medial meniscus repair on 10/18/2022, with left knee sprain\par \par Plan: I had a long discussion with the patient today regarding the nature of their diagnosis and treatment plan. We discussed the risks and benefits of no treatment as well as nonoperative and operative treatments.  I reviewed the x-rays of the left knee today that revealed no acute bony pathology.  On examination today he has good range of motion and strength with no instability.  This time I am recommending conservative treatment including ice, heat, rest, over-the-counter anti-inflammatories, physical therapy and bracing for symptomatic relief.  GI precautions were discussed and prescriptions were provided today.  He will avoid exacerbating activities and follow-up in 6 to 8 weeks for repeat evaluation to reassess his left knee.  If symptoms were to persist despite conservative treatment we may recommend MRI.  Patient seen and examined with Dr. Gallo today.\par  \par The patient verbalizes their understanding and agrees with the plan.  All questions were answered to their satisfaction.

## 2023-06-16 NOTE — BEGINNING OF VISIT
[Patient] : patient [] :  [[employer] ] : provided by [employer]   [Interpreters_IDNumber] : [id number] [Interpreters_FullName] : Brenda [[email or web address]] : [place]ese

## 2023-06-16 NOTE — PHYSICAL EXAM
[de-identified] : On exam, the patient is pleasant.  They  are awake, alert, and oriented x3.  The patient presents today with no assistive devices\par Weight is appropriate for height\par Full range of motion of cervical spine without instability\par Full range of motion of back without instability\par Intact neurologic, vascular, and dermatologic exam to right and left upper extremities\par Intact neurologic, vascular, and dermatologic exam to right and left lower extremities\par \par Right lower Extremity\par The patient's incisions are well-healed. No erythema, warmth, or drainage.  Incisions are well-healed benign appearance.  There is no postoperative effusion.  No bony tenderness to palpation about the knee. Range of motion: 0 to 130 degrees with no pain.  Negative Rafa test, anterior drawer negative, Posterior Drawer negative, Lachman negative. No varus or valgus laxity at 0 or 30 degrees of knee flexion.  EHL/FHL/TA/GSC motor function is intact, sensations intact light touch in L2-S1 distributions, DP/PT pulses are palpable, compartments are soft and compressible, there is no calf tenderness to palpation bilaterally.\par \par Left knee Examination:\par Physical examination of the knee demonstrates normal skin without signs of skin changes or abnormalities. No erythema, warmth, or joint effusion is appreciated .\par  \par Sensation is intact to light touch L2-S1\par Palpable DP/PT pulse\par EHL/FHL/TA/GSC motor function intact\par  \par Range of Motion\par 0-130 degrees\par  \par Strength Testing\par Quadriceps/Hamstring 5/5\par Patient is able to perform a straight leg raise without difficulty.\par  \par Palpation\par Not tender to palpation about the distal femur, proximal tibia, or patella\par No palpable defect appreciated in the quadriceps or patellar tendons\par Not tender to palpation of medial joint line\par Not tender to palpation of lateral joint line\par  \par Special Tests\par Anterior Drawer negative\par Posterior Drawer negative\par Lachman Exam negative\par No Varus or Valgus Laxity at 0 or 30 degrees of knee flexion\par Rafa's Test negative for pain or crepitus\par Active compression of the patella negative for pain or crepitus\par Translation of the patella 2 quadrants with a firm endpoint [de-identified] : X-rays 3 views of the left knee taken in the office today on 6/16/2023 reveals no acute fracture or dislocation.

## 2023-07-24 ENCOUNTER — APPOINTMENT (OUTPATIENT)
Dept: ORTHOPEDIC SURGERY | Facility: CLINIC | Age: 38
End: 2023-07-24
Payer: OTHER MISCELLANEOUS

## 2023-07-24 DIAGNOSIS — M25.562 PAIN IN LEFT KNEE: ICD-10-CM

## 2023-07-24 PROCEDURE — 99213 OFFICE O/P EST LOW 20 MIN: CPT

## 2023-07-24 NOTE — REASON FOR VISIT
[Follow-Up Visit] : a follow-up visit for [Workers' Comp: Date of Injury: _______] : This visit is related to worker's compensation. Date of Injury: [unfilled] [Other: ____] : [unfilled] [[employer] ] : provided by [employer]   [Interpreters_IDNumber] : [id number] [Interpreters_FullName] : Xuan [TWNoteComboBox1] : [place]

## 2023-07-24 NOTE — BEGINNING OF VISIT
[Patient] : patient [] :  [[employer] ] : provided by [employer]   [Interpreters_IDNumber] : [id number] [Interpreters_FullName] : Brenda [[email or web address]] : [place]

## 2023-07-24 NOTE — REASON FOR VISIT
[Follow-Up Visit] : a follow-up visit for [Workers' Comp: Date of Injury: _______] : This visit is related to worker's compensation. Date of Injury: [unfilled] [Other: ____] : [unfilled] [[employer] ] : provided by [employer]   [Interpreters_IDNumber] : [id number] [Interpreters_FullName] : Xuan [[email or web address]] : [place]

## 2023-07-24 NOTE — DISCUSSION/SUMMARY
[de-identified] : Assessment: 37-year-old male status post right knee ACL graft reconstruction with BTB allograft and medial meniscus repair on 10/18/2022, with resolved left knee sprain\par \par Plan: I had a long discussion with the patient today regarding the nature of their diagnosis and treatment plan. We discussed the risks and benefits of no treatment as well as nonoperative and operative treatments.  I reviewed the x-rays of the left knee today that revealed no acute bony pathology.  On examination today he has full range of motion of the left knee without any pain.  His symptoms have completely resolved.  He may return to his normal activities as tolerated with the left knee.  Regarding the right knee, he is not months out from his ACL reconstruction surgery and is doing well.  He is not able to single-leg squat on either side which is his baseline.  He has minimal quadriceps atrophy.  At this point may gradually return to his normal activities as tolerated no restriction.  We will obtain a functional ACL brace for his right side under his private insurance.  He may follow-up for annual follow-up.\par  \par The patient verbalizes their understanding and agrees with the plan.  All questions were answered to their satisfaction.

## 2023-07-24 NOTE — PHYSICAL EXAM
[de-identified] : On exam, the patient is pleasant.  They  are awake, alert, and oriented x3.  The patient presents today with no assistive devices\par Weight is appropriate for height\par Full range of motion of cervical spine without instability\par Full range of motion of back without instability\par Intact neurologic, vascular, and dermatologic exam to right and left upper extremities\par Intact neurologic, vascular, and dermatologic exam to right and left lower extremities\par \par Right lower Extremity\par The patient's incisions are well-healed. No erythema, warmth, or drainage.  Incisions are well-healed benign appearance.  There is no postoperative effusion.  No bony tenderness to palpation about the knee. Range of motion: 0 to 130 degrees with no pain.  Negative Rafa test, anterior drawer negative, Posterior Drawer negative, Lachman negative. No varus or valgus laxity at 0 or 30 degrees of knee flexion.  EHL/FHL/TA/GSC motor function is intact, sensations intact light touch in L2-S1 distributions, DP/PT pulses are palpable, compartments are soft and compressible, there is no calf tenderness to palpation bilaterally.  Patient is unable to single-leg squat on either side.\par \par Left knee Examination:\par Physical examination of the knee demonstrates normal skin without signs of skin changes or abnormalities. No erythema, warmth, or joint effusion is appreciated .\par  \par Sensation is intact to light touch L2-S1\par Palpable DP/PT pulse\par EHL/FHL/TA/GSC motor function intact\par  \par Range of Motion\par 0-130 degrees\par  \par Strength Testing\par Quadriceps/Hamstring 5/5\par Patient is able to perform a straight leg raise without difficulty.\par  \par Palpation\par Not tender to palpation about the distal femur, proximal tibia, or patella\par No palpable defect appreciated in the quadriceps or patellar tendons\par Not tender to palpation of medial joint line\par Not tender to palpation of lateral joint line\par  \par Special Tests\par Anterior Drawer negative\par Posterior Drawer negative\par Lachman Exam negative\par No Varus or Valgus Laxity at 0 or 30 degrees of knee flexion\par Rafa's Test negative for pain or crepitus\par Active compression of the patella negative for pain or crepitus\par Translation of the patella 2 quadrants with a firm endpoint [de-identified] : X-rays 3 views of the left knee taken in the office on 6/16/2023 reveals no acute fracture or dislocation.

## 2023-07-24 NOTE — BEGINNING OF VISIT
[Patient] : patient [] :  [[employer] ] : provided by [employer]   [Interpreters_IDNumber] : [id number] [Interpreters_FullName] : Brenda [TWNoteComboBox1] : Guyanese

## 2023-07-24 NOTE — DISCUSSION/SUMMARY
[de-identified] : Assessment: 37-year-old male status post right knee ACL graft reconstruction with BTB allograft and medial meniscus repair on 10/18/2022, with resolved left knee sprain\par \par Plan: I had a long discussion with the patient today regarding the nature of their diagnosis and treatment plan. We discussed the risks and benefits of no treatment as well as nonoperative and operative treatments.  I reviewed the x-rays of the left knee today that revealed no acute bony pathology.  On examination today he has full range of motion of the left knee without any pain.  His symptoms have completely resolved.  He may return to his normal activities as tolerated with the left knee.  Regarding the right knee, he is not months out from his ACL reconstruction surgery and is doing well.  He is not able to single-leg squat on either side which is his baseline.  He has minimal quadriceps atrophy.  At this point may gradually return to his normal activities as tolerated no restriction.  We will obtain a functional ACL brace for his right side under his private insurance.  He may follow-up for annual follow-up.\par  \par The patient verbalizes their understanding and agrees with the plan.  All questions were answered to their satisfaction.

## 2023-07-24 NOTE — HISTORY OF PRESENT ILLNESS
[de-identified] : Patient is a 37-year-old male who returns the office today for evaluation of his left knee.  He is also status post right knee ACL graft reconstruction with BTB allograft and medial meniscus repair.  This visit was performed with the use of a .  Patient states that his left knee is completely better and the pain has resolved with conservative treatment.  His right knee is also doing well today.  He returns for routine follow-up.  The patient denies any fevers, chills, sweats, recent illnesses, numbness, tingling, weakness, or pain elsewhere at this time.

## 2023-07-24 NOTE — PHYSICAL EXAM
[de-identified] : On exam, the patient is pleasant.  They  are awake, alert, and oriented x3.  The patient presents today with no assistive devices\par Weight is appropriate for height\par Full range of motion of cervical spine without instability\par Full range of motion of back without instability\par Intact neurologic, vascular, and dermatologic exam to right and left upper extremities\par Intact neurologic, vascular, and dermatologic exam to right and left lower extremities\par \par Right lower Extremity\par The patient's incisions are well-healed. No erythema, warmth, or drainage.  Incisions are well-healed benign appearance.  There is no postoperative effusion.  No bony tenderness to palpation about the knee. Range of motion: 0 to 130 degrees with no pain.  Negative Rafa test, anterior drawer negative, Posterior Drawer negative, Lachman negative. No varus or valgus laxity at 0 or 30 degrees of knee flexion.  EHL/FHL/TA/GSC motor function is intact, sensations intact light touch in L2-S1 distributions, DP/PT pulses are palpable, compartments are soft and compressible, there is no calf tenderness to palpation bilaterally.  Patient is unable to single-leg squat on either side.\par \par Left knee Examination:\par Physical examination of the knee demonstrates normal skin without signs of skin changes or abnormalities. No erythema, warmth, or joint effusion is appreciated .\par  \par Sensation is intact to light touch L2-S1\par Palpable DP/PT pulse\par EHL/FHL/TA/GSC motor function intact\par  \par Range of Motion\par 0-130 degrees\par  \par Strength Testing\par Quadriceps/Hamstring 5/5\par Patient is able to perform a straight leg raise without difficulty.\par  \par Palpation\par Not tender to palpation about the distal femur, proximal tibia, or patella\par No palpable defect appreciated in the quadriceps or patellar tendons\par Not tender to palpation of medial joint line\par Not tender to palpation of lateral joint line\par  \par Special Tests\par Anterior Drawer negative\par Posterior Drawer negative\par Lachman Exam negative\par No Varus or Valgus Laxity at 0 or 30 degrees of knee flexion\par Rafa's Test negative for pain or crepitus\par Active compression of the patella negative for pain or crepitus\par Translation of the patella 2 quadrants with a firm endpoint [de-identified] : X-rays 3 views of the left knee taken in the office on 6/16/2023 reveals no acute fracture or dislocation.

## 2023-09-25 NOTE — ED ADULT TRIAGE NOTE - DIRECT TO ROOM CARE INITIATED:
-------------------------------------------  [Contact Information]  -------------------------------------------  - Central Scheduling (for imaging/ other tests):   [place]: [phone].  -------------------------------------------  - Colonoscopy [phone].  -------------------------------------------  - Coronary Calcium Scan: [phone] or [employer].  -------------------------------------------  - Smoking Cessation Information: -QUIT NOW ([phone])  -------------------------------------------  - [place] Helpline for Opioids & Other Substances:   3-601-7QAFDOFUP (1-568.807.7302)   -------------------------------------------  - Suicide Prevention Hotline #: If you or someone you know is having thoughts of suicide or experiencing a mental health or substance use crisis, 98 provides  connection to confidential support. There is hope. You are not alone. Just call or text 341 or chat [employer], 988 connects you with a trained crisis counselor who can help.  -------------------------------------------  [[facility]  - [address] .  - Phone: [phone]  [[facility] in [place], [place]]:   - [address], [place] [address]  - Phone: [phone]  -------------------------------------------  [Instruction]    -   -   -   -    07-Jul-2022 17:29

## 2023-11-13 ENCOUNTER — APPOINTMENT (OUTPATIENT)
Dept: ORTHOPEDIC SURGERY | Facility: CLINIC | Age: 38
End: 2023-11-13
Payer: OTHER MISCELLANEOUS

## 2023-11-13 DIAGNOSIS — S83.519A SPRAIN OF ANTERIOR CRUCIATE LIGAMENT OF UNSPECIFIED KNEE, INITIAL ENCOUNTER: ICD-10-CM

## 2023-11-13 DIAGNOSIS — M94.261 CHONDROMALACIA, RIGHT KNEE: ICD-10-CM

## 2023-11-13 DIAGNOSIS — S83.249A OTHER TEAR OF MEDIAL MENISCUS, CURRENT INJURY, UNSPECIFIED KNEE, INITIAL ENCOUNTER: ICD-10-CM

## 2023-11-13 PROCEDURE — 99455 WORK RELATED DISABILITY EXAM: CPT
